# Patient Record
Sex: MALE | Race: WHITE | Employment: PART TIME | URBAN - METROPOLITAN AREA
[De-identification: names, ages, dates, MRNs, and addresses within clinical notes are randomized per-mention and may not be internally consistent; named-entity substitution may affect disease eponyms.]

---
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
## 2025-05-04 ENCOUNTER — HOSPITAL ENCOUNTER (EMERGENCY)
Age: 20
Discharge: HOME | End: 2025-05-04
Attending: EMERGENCY MEDICINE
Payer: COMMERCIAL

## 2025-05-04 VITALS
DIASTOLIC BLOOD PRESSURE: 88 MMHG | BODY MASS INDEX: 21.5 KG/M2 | RESPIRATION RATE: 14 BRPM | HEART RATE: 79 BPM | TEMPERATURE: 97.7 F | WEIGHT: 158.7 LBS | SYSTOLIC BLOOD PRESSURE: 138 MMHG | OXYGEN SATURATION: 98 % | HEIGHT: 72 IN

## 2025-05-04 DIAGNOSIS — F41.1 ANXIETY STATE: ICD-10-CM

## 2025-05-04 DIAGNOSIS — G47.9 DIFFICULTY SLEEPING: Primary | ICD-10-CM

## 2025-05-04 PROCEDURE — 99283 EMERGENCY DEPT VISIT LOW MDM: CPT

## 2025-05-04 RX ORDER — LORAZEPAM 1 MG/1
1 TABLET ORAL NIGHTLY
Qty: 7 TABLET | Refills: 0 | Status: SHIPPED | OUTPATIENT
Start: 2025-05-04 | End: 2025-05-11

## 2025-05-04 NOTE — DISCHARGE INSTRUCTIONS
Please start with 0.5 mg of Ativan, may be around 8 PM before going to bed.  It might make you groggy in the morning.  Please do not mix with alcohol whatsoever.  Come back for any adverse effects you have from it.  It is usually safe medicine, but you can become addicted to Ativan and benzodiazepines after prolonged use.    Please follow-up with the primary care doctor information we gave you, for further management.  You may need a sleep study like we talked about.    Thank you for allowing me the opportunity take care of you today.  You will likely get a survey in the mail regarding your visit.  If you thought your care was high-quality and your experience was positive, please rate us with a '9' or a '10'. These scores will have a positive impact on our standing in the community! Take care, and we're always available if you need us. --Dr. Dejon D.O.

## 2025-05-04 NOTE — ED PROVIDER NOTES
Emergency Department Attending Physician Note  Location: [facility] EMERGENCY DEPARTMENT  5/4/2025       Pt Name: Jayesh Cardenas  MRN: [id number]  Birthdate 2005    Date of evaluation: 5/4/2025  Provider: LAXMI CAMPBELL DO  PCP: Iam Weinstein MD    Note Started: 5:40 PM EDT 5/4/25    CHIEF COMPLAINT  Chief Complaint   Patient presents with    Insomnia       ROOM: 3    HISTORY OF PRESENT ILLNESS:  History obtained by patient. Limitations to history : None.     Jayesh Cardenas is a 19 y.o. male with a significant PMHx of no real past medical history, here in the emergency department today with what he describes as 4 nights of very difficult sleep.  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  He says \"I do not think I am anxious, but I just really cannot sleep.\"  He says his whole body is starting to ache, and he has not really eaten much over the past couple days.  When he has talked to a primary care doctor about this in the past, after a couple sleepless nights he has had, they recommended sleep hygiene, and maybe a sleep study, but he got better on its own, and never got that sleep study.  That was a couple years ago.  Here, he says that he has been falling asleep maybe around 5 AM, but has to wake up for school.  He is not concerned about any mental health issues, he says he is very motivated, doing well in his school, and has not had any other concerns.  Denies any chest pain, back pain, lightheadedness, dizziness.  No falls or injuries.  No headaches.  Says overall he is just starting to feel very poorly, needs to get some sleep.      Nursing Notes were all reviewed and agreed with or any disagreements were addressed in the HPI.      MEDICAL HISTORY  No past medical history

## 2025-05-06 ENCOUNTER — TELEPHONE (OUTPATIENT)
Dept: PRIMARY CARE CLINIC | Age: 20
End: 2025-05-06

## 2025-05-06 NOTE — TELEPHONE ENCOUNTER
----- Message from Levy CALDERA sent at 5/6/2025 11:48 AM EDT -----  Regarding: [facility] Appointment Request  [facility] Appointment Request    Patient needs appointment for [facility] Appointment Type: New Patient.    Patient Requested Dates(s): May 19,2025  Patient Requested Time: Afternoon  Provider Name: any doctor    Reason for Appointment Request: New Patient - No appointments available during search  - Establish care, new patient ([id number])  -Patient went to ER due to some sleep issue, May 4,2025  --------------------------------------------------------------------------------------------------------------------------    Relationship to Patient: Self     Call Back Information: OK to leave message on voicemail  Preferred Call Back Number: Phone [phone]